# Patient Record
Sex: FEMALE | Race: WHITE
[De-identification: names, ages, dates, MRNs, and addresses within clinical notes are randomized per-mention and may not be internally consistent; named-entity substitution may affect disease eponyms.]

---

## 2017-03-13 ENCOUNTER — HOSPITAL ENCOUNTER (OUTPATIENT)
Dept: HOSPITAL 47 - RADMAMWWP | Age: 57
Discharge: HOME | End: 2017-03-13
Attending: OBSTETRICS & GYNECOLOGY
Payer: COMMERCIAL

## 2017-03-13 DIAGNOSIS — Z12.31: Primary | ICD-10-CM

## 2017-03-14 NOTE — MM
Reason for exam: screening  (asymptomatic).

Last mammogram was performed 1 year and 2 months ago.



History:

Patient is postmenopausal.

Taking estrogen for 7 years beginning at age 48.



Physical Findings:

A clinical breast exam by your physician is recommended on an annual basis and 

results should be correlated with mammographic findings.



MG Screening Mammo w CAD

Bilateral CC and MLO view(s) were taken.

Prior study comparison: January 18, 2016, bilateral MG screening mammo w CAD.  

March 19, 2014, CAD bilateral diagnostic mammogram.

No significant changes when compared with prior studies.





ASSESSMENT: Benign, BI-RAD 2



RECOMMENDATION:

Routine screening mammogram of both breasts in 1 year.

## 2018-04-03 ENCOUNTER — HOSPITAL ENCOUNTER (OUTPATIENT)
Dept: HOSPITAL 47 - RADUSMAIN | Age: 58
End: 2018-04-03
Payer: COMMERCIAL

## 2018-04-03 DIAGNOSIS — R10.13: ICD-10-CM

## 2018-04-03 DIAGNOSIS — R13.10: Primary | ICD-10-CM

## 2018-04-03 PROCEDURE — 76700 US EXAM ABDOM COMPLETE: CPT

## 2018-04-03 PROCEDURE — 74210 X-RAY XM PHRNX&/CRV ESOPH C+: CPT

## 2018-04-03 NOTE — US
EXAMINATION TYPE: US abdomen complete

 

DATE OF EXAM: 4/3/2018

 

COMPARISON: NONE

 

CLINICAL HISTORY: R13.10 epigastric pain. Pt states GERD with epigastric pain

 

EXAM MEASUREMENTS:

 

Liver Length:  17.5 cm   

Gallbladder Wall:  0.2 cm   

CBD:  0.3 cm

Spleen:  8.9 cm   

Right Kidney:  10.9 x 4.6 x 5.9 cm 

Left Kidney:  11.4 x 6.3 x 4.7 cm   

 

 

 

Pancreas:  wnl, tail obscured by overlying bowel gas

Liver:  Upper limits of normal for size with slight diminished visualization of the portal triads sug
gesting very mild hepatic steatosis.

Gallbladder:  wnl

**Evidence for sonographic Cárdenas's sign:  No

CBD:  wnl 

Spleen:  Difficult to visualize due to overlying bowel gas, visualized portions appeared wnl   

Right Kidney:  wnl   

Left Kidney:  wnl, lower pole gassed out   

Upper IVC:  wnl  

Abd Aorta:  wnl

 

 

 

The intrahepatic portion of the IVC and proximal abdominal aorta are within normal limits.  There is 
no evidence of cholelithiasis.  Common bile duct is unremarkable.  The visualized portions of the pan
creas are homogenous.  The spleen is unremarkable.  Kidneys are symmetric and free of hydronephrosis.
  No renal lesions are seen.

 

IMPRESSION: Findings suggesting very mild hepatic steatosis, otherwise unremarkable examination.

## 2018-04-03 NOTE — FL
EXAMINATION TYPE: FL esophagus cervic/pharynx

 

DATE OF EXAM: 4/3/2018

 

HISTORY: Epigastric pain

 

COMPARISON: NONE

 

TECHNIQUE:  A double contrast esophagram is performed utilizing air and barium.  

 

FINDINGS: 49 seconds of fluoroscopy time was provided for procedure. 11 images were obtained.

 

Esophagus dilates to normal caliber has normal contour to the gastroesophageal junction. Gastroesopha
geal junction opens to normal caliber. No reflux was evident during the examination. There is complet
e stripping of the esophageal bolus in the horizontal drinking position. No intraluminal or extramura
l defects are evident.

 

IMPRESSION:  

1. Normal esophagram

## 2018-12-03 ENCOUNTER — HOSPITAL ENCOUNTER (OUTPATIENT)
Dept: HOSPITAL 47 - RADMAMWWP | Age: 58
Discharge: HOME | End: 2018-12-03
Payer: COMMERCIAL

## 2018-12-03 DIAGNOSIS — Z12.31: Primary | ICD-10-CM

## 2018-12-03 PROCEDURE — 77067 SCR MAMMO BI INCL CAD: CPT

## 2018-12-03 PROCEDURE — 77063 BREAST TOMOSYNTHESIS BI: CPT

## 2018-12-04 NOTE — MM
Reason for exam: screening  (asymptomatic).

Last mammogram was performed 1 year and 9 months ago.



History:

Patient is postmenopausal.

Taking estrogen for 8 years beginning at age 48.



Physical Findings:

A clinical breast exam by your physician is recommended on an annual basis and 

results should be correlated with mammographic findings.



MG 3D Screening Mammo W/Cad

Bilateral CC and MLO view(s) were taken.

Prior study comparison: March 13, 2017, bilateral MG screening mammo w CAD.  

January 18, 2016, bilateral MG screening mammo w CAD.

The breast tissue is heterogeneously dense. This may lower the sensitivity of 

mammography.  There is no discrete abnormality.  No significant changes when 

compared with prior studies.





ASSESSMENT: Negative, BI-RAD 1



RECOMMENDATION:

Routine screening mammogram of both breasts in 1 year.

## 2020-07-14 ENCOUNTER — HOSPITAL ENCOUNTER (OUTPATIENT)
Dept: HOSPITAL 47 - RADMAMWWP | Age: 60
Discharge: HOME | End: 2020-07-14
Attending: OBSTETRICS & GYNECOLOGY
Payer: COMMERCIAL

## 2020-07-14 DIAGNOSIS — Z12.31: Primary | ICD-10-CM

## 2020-07-14 PROCEDURE — 77063 BREAST TOMOSYNTHESIS BI: CPT

## 2020-07-14 PROCEDURE — 77067 SCR MAMMO BI INCL CAD: CPT

## 2020-07-15 NOTE — MM
Reason for exam: screening  (asymptomatic).

Last mammogram was performed 1 year and 7 months ago.



History:

Patient is postmenopausal.

Taking estrogen for 9 years beginning at age 48.



Physical Findings:

A clinical breast exam by your physician is recommended on an annual basis and 

results should be correlated with mammographic findings.



MG 3D Screening Mammo W/Cad

Bilateral CC and MLO view(s) were taken.

Prior study comparison: December 3, 2018, bilateral MG 3d screening mammo w/cad.  

March 13, 2017, bilateral MG screening mammo w CAD.

The breast tissue is heterogeneously dense. This may lower the sensitivity of 

mammography.  There is no discrete abnormality.  No significant changes when 

compared with prior studies.





ASSESSMENT: Negative, BI-RAD 1



RECOMMENDATION:

Routine screening mammogram of both breasts in 1 year.